# Patient Record
Sex: MALE | Race: OTHER | NOT HISPANIC OR LATINO | URBAN - METROPOLITAN AREA
[De-identification: names, ages, dates, MRNs, and addresses within clinical notes are randomized per-mention and may not be internally consistent; named-entity substitution may affect disease eponyms.]

---

## 2019-08-06 ENCOUNTER — EMERGENCY (EMERGENCY)
Facility: HOSPITAL | Age: 49
LOS: 1 days | Discharge: HOME | End: 2019-08-06
Attending: EMERGENCY MEDICINE | Admitting: EMERGENCY MEDICINE
Payer: SELF-PAY

## 2019-08-06 VITALS
HEART RATE: 67 BPM | DIASTOLIC BLOOD PRESSURE: 107 MMHG | RESPIRATION RATE: 18 BRPM | TEMPERATURE: 98 F | SYSTOLIC BLOOD PRESSURE: 167 MMHG | OXYGEN SATURATION: 100 %

## 2019-08-06 PROCEDURE — 99284 EMERGENCY DEPT VISIT MOD MDM: CPT

## 2019-08-06 NOTE — ED PROVIDER NOTE - CLINICAL SUMMARY MEDICAL DECISION MAKING FREE TEXT BOX
Pt is a 50 y/o M w/ a PMHx of HTN and HLD who p/w left sided chest pain with low to moderate suspicion for ACS (HEART Score of 4). low suspicion for PE or dissection of PTx or PNA given histroy and physical exam. Will get CBC, CMP, trop, EKG, CXR, PT/INR, aPTT, give ASA, and likely CDU for Stress and echo.

## 2019-08-06 NOTE — ED PROVIDER NOTE - ATTENDING CONTRIBUTION TO CARE
49M  L side CP rad down L arm x 1 week, today it became worse.  Still having CP.  No cough, no fever, no vomiting, no LOC.  no leg swelling (+)SOB.  Sometimes some burning in his throat.   Worse after eating.  PMHX - HTN, HLD.  PSHX - none.  No T.  All - NKA.  Pt from [facility], came here 2 weeks ago. No h/o ST / angio. FA started having CAD in his 50's.   cousin had MI and  at 41.  Plan check labs, CE, CXR.  pt ambivalent about staying due to not a resident here and has no insurance.  Offered hospital admission but pt may not stay would be AMA.  No CDU beds available. 49M  L side CP rad down L arm x 1 week, today it became worse.  Still having CP.  No cough, no fever, no vomiting, no LOC.  no leg swelling (+)SOB.  Sometimes some burning in his throat.   Worse after eating.  PMHX - HTN, HLD.  PSHX - none.  No T.  All - NKA.  Pt from [facility], came here 2 weeks ago. No h/o ST / angio. FA started having CAD in his 50's.   cousin had MI and  at 41.  PERC negative.  Plan check labs, CE, CXR.  pt ambivalent about staying due to not a resident here and has no insurance.  Offered hospital admission but pt may not stay would be AMA.  No CDU beds available.  VS:  unremarkable except htn    GEN - NAD;malaise; A+O x3   HEAD - NC/AT     ENT - PEERL, EOMI, mucous membranes dry , no discharge      NECK: Neck supple, non-tender without lymphadenopathy, no masses, no JVD  PULM - CTA b/l,  symmetric breath sounds  COR -  normal heart sounds    ABD - , ND, NT, soft,  BACK - no CVA tenderness, nontender spine     EXTREMS - no edema, no deformity, warm and well perfused    SKIN - no rash or bruising      NEUROLOGIC - alert, CN 2-12 intact, sensation nl, motor no focal deficit.

## 2019-08-06 NOTE — ED ADULT TRIAGE NOTE - CHIEF COMPLAINT QUOTE
chest pain radiating to the left arm, shoulder and back on and off since last week. c/o dizziness and SOB on and off. PMH of HTN but does not take his medication regularly. PMH of high cholesterol

## 2019-08-06 NOTE — ED PROVIDER NOTE - PROGRESS NOTE DETAILS
Repeat EKG sinus jamilah but otherwise unchanged and without ischemic changes. Trop 7-->7. ideally patient could go to CDU for stress test, however no beds available and offered patient admission but desires not to stay in hospital and to follow-up with a cardiologist as an out patient for further testing.   -Pipe Trejo PGY4 [facility]#[id number]

## 2019-08-06 NOTE — ED PROVIDER NOTE - OBJECTIVE STATEMENT
Pt is a 48 y/o M w/ a PMHx of HTn and HLD who p/w left sided chest pain that started about a week ago. Pt states that it is like a squeezing pain that radiates to the left arm, and associated with shortness of breath and dry mouth, pt states it is not associated with exertion and is mostly when he is sitting down. Also associated with palpitations. Denies fevers and coughing, does not change with position. Also associated with pre-syncope. Pt is a 50 y/o M w/ a PMHx of HTN and HLD who p/w left sided chest pain that started about a week ago. Pt states that it is like a squeezing pain that radiates to the left arm, and associated with shortness of breath and dry mouth, pt states it is not associated with exertion and is mostly when he is sitting down. Also associated with palpitations. Denies fevers and coughing, does not change with position. Also associated with pre-syncope.

## 2019-08-06 NOTE — ED PROVIDER NOTE - NSFOLLOWUPINSTRUCTIONS_ED_ALL_ED_FT
You were seen at [facility] for chest pain. You had normal chest xray, normal lab work twice, and 2 normal EKGs. You likely are not having a heart attach and no opther dangerous pathology involving your lungs. This pain could be muscular in nature.     You can take 600mg of ibuprofen with food every 6 hours for 48 hours and then every 6 hours as needed for pain.     You are to follow-up with a cardiologist in the next 1 week to further discuss testing such as a stress test.  You can call one of the numbers on the referral form or you can call the number below.     You are to follow-up in the next 1-2 weeks with [facility] Division of Cardiology at [facility]. Please call [phone] for an appointment.     You should return to the hospital if you begin to have persistent chest pain especially that ratiates to the arm/jaw/back, shortness of breath or chest pain with exertion that is different than normal for you, new or worsening in any lower extremity edema (swelling), sudden onset of cold sweats with difficulty breathing, or for any other concerns.  Form more information on Heart Health please visit the American [employer] Association's Website at: [email or web address]

## 2019-08-07 VITALS
OXYGEN SATURATION: 100 % | HEART RATE: 59 BPM | SYSTOLIC BLOOD PRESSURE: 144 MMHG | DIASTOLIC BLOOD PRESSURE: 77 MMHG | RESPIRATION RATE: 16 BRPM | TEMPERATURE: 96 F

## 2019-08-07 LAB
ALBUMIN SERPL ELPH-MCNC: 4.7 G/DL — SIGNIFICANT CHANGE UP (ref 3.3–5)
ALP SERPL-CCNC: 55 U/L — SIGNIFICANT CHANGE UP (ref 40–120)
ALT FLD-CCNC: 35 U/L — SIGNIFICANT CHANGE UP (ref 4–41)
ANION GAP SERPL CALC-SCNC: 11 MMO/L — SIGNIFICANT CHANGE UP (ref 7–14)
APTT BLD: 29.8 SEC — SIGNIFICANT CHANGE UP (ref 27.5–36.3)
AST SERPL-CCNC: 25 U/L — SIGNIFICANT CHANGE UP (ref 4–40)
BASOPHILS # BLD AUTO: 0.05 K/UL — SIGNIFICANT CHANGE UP (ref 0–0.2)
BASOPHILS NFR BLD AUTO: 0.6 % — SIGNIFICANT CHANGE UP (ref 0–2)
BILIRUB SERPL-MCNC: 0.5 MG/DL — SIGNIFICANT CHANGE UP (ref 0.2–1.2)
BUN SERPL-MCNC: 21 MG/DL — SIGNIFICANT CHANGE UP (ref 7–23)
CALCIUM SERPL-MCNC: 9.7 MG/DL — SIGNIFICANT CHANGE UP (ref 8.4–10.5)
CHLORIDE SERPL-SCNC: 102 MMOL/L — SIGNIFICANT CHANGE UP (ref 98–107)
CO2 SERPL-SCNC: 27 MMOL/L — SIGNIFICANT CHANGE UP (ref 22–31)
CREAT SERPL-MCNC: 1.11 MG/DL — SIGNIFICANT CHANGE UP (ref 0.5–1.3)
EOSINOPHIL # BLD AUTO: 0.32 K/UL — SIGNIFICANT CHANGE UP (ref 0–0.5)
EOSINOPHIL NFR BLD AUTO: 4 % — SIGNIFICANT CHANGE UP (ref 0–6)
GLUCOSE SERPL-MCNC: 104 MG/DL — HIGH (ref 70–99)
HCT VFR BLD CALC: 48.1 % — SIGNIFICANT CHANGE UP (ref 39–50)
HGB BLD-MCNC: 16.5 G/DL — SIGNIFICANT CHANGE UP (ref 13–17)
IMM GRANULOCYTES NFR BLD AUTO: 0.4 % — SIGNIFICANT CHANGE UP (ref 0–1.5)
INR BLD: 0.99 — SIGNIFICANT CHANGE UP (ref 0.88–1.17)
LYMPHOCYTES # BLD AUTO: 2.62 K/UL — SIGNIFICANT CHANGE UP (ref 1–3.3)
LYMPHOCYTES # BLD AUTO: 32.7 % — SIGNIFICANT CHANGE UP (ref 13–44)
MCHC RBC-ENTMCNC: 30.7 PG — SIGNIFICANT CHANGE UP (ref 27–34)
MCHC RBC-ENTMCNC: 34.3 % — SIGNIFICANT CHANGE UP (ref 32–36)
MCV RBC AUTO: 89.4 FL — SIGNIFICANT CHANGE UP (ref 80–100)
MONOCYTES # BLD AUTO: 0.62 K/UL — SIGNIFICANT CHANGE UP (ref 0–0.9)
MONOCYTES NFR BLD AUTO: 7.7 % — SIGNIFICANT CHANGE UP (ref 2–14)
NEUTROPHILS # BLD AUTO: 4.37 K/UL — SIGNIFICANT CHANGE UP (ref 1.8–7.4)
NEUTROPHILS NFR BLD AUTO: 54.6 % — SIGNIFICANT CHANGE UP (ref 43–77)
NRBC # FLD: 0 K/UL — SIGNIFICANT CHANGE UP (ref 0–0)
PLATELET # BLD AUTO: 247 K/UL — SIGNIFICANT CHANGE UP (ref 150–400)
PMV BLD: 10 FL — SIGNIFICANT CHANGE UP (ref 7–13)
POTASSIUM SERPL-MCNC: 4.2 MMOL/L — SIGNIFICANT CHANGE UP (ref 3.5–5.3)
POTASSIUM SERPL-SCNC: 4.2 MMOL/L — SIGNIFICANT CHANGE UP (ref 3.5–5.3)
PROT SERPL-MCNC: 7.6 G/DL — SIGNIFICANT CHANGE UP (ref 6–8.3)
PROTHROM AB SERPL-ACNC: 11 SEC — SIGNIFICANT CHANGE UP (ref 9.8–13.1)
RBC # BLD: 5.38 M/UL — SIGNIFICANT CHANGE UP (ref 4.2–5.8)
RBC # FLD: 11.7 % — SIGNIFICANT CHANGE UP (ref 10.3–14.5)
SODIUM SERPL-SCNC: 140 MMOL/L — SIGNIFICANT CHANGE UP (ref 135–145)
TROPONIN T, HIGH SENSITIVITY: 7 NG/L — SIGNIFICANT CHANGE UP (ref ?–14)
TROPONIN T, HIGH SENSITIVITY: 7 NG/L — SIGNIFICANT CHANGE UP (ref ?–14)
WBC # BLD: 8.01 K/UL — SIGNIFICANT CHANGE UP (ref 3.8–10.5)
WBC # FLD AUTO: 8.01 K/UL — SIGNIFICANT CHANGE UP (ref 3.8–10.5)

## 2019-08-07 PROCEDURE — 71046 X-RAY EXAM CHEST 2 VIEWS: CPT | Mod: 26

## 2019-08-07 RX ORDER — ASPIRIN/CALCIUM CARB/MAGNESIUM 324 MG
162 TABLET ORAL ONCE
Refills: 0 | Status: COMPLETED | OUTPATIENT
Start: 2019-08-07 | End: 2019-08-07

## 2019-08-07 RX ADMIN — Medication 162 MILLIGRAM(S): at 00:27

## 2019-08-07 NOTE — ED ADULT NURSE NOTE - NSIMPLEMENTINTERV_GEN_ALL_ED
Implemented All Universal Safety Interventions:  [place] to call system. Call bell, personal items and telephone within reach. Instruct patient to call for assistance. Room bathroom lighting operational. Non-slip footwear when patient is off stretcher. Physically safe environment: no spills, clutter or unnecessary equipment. Stretcher in lowest position, wheels locked, appropriate side rails in place.

## 2019-08-07 NOTE — ED ADULT NURSE NOTE - OBJECTIVE STATEMENT
Pt received to room 9 a/o x 3 c/o intermittent left sided chest pressure that radiates down his left arm and upper back, SOB, Dizziness x 1 week. Pt has hx of hypertension but doesn't take hit medication regularly. Respirations even and unlabored. Lung sounds clear with equal chest rise bilaterally. ABD is soft, non tender, non distended with normal active bowel sounds No complaints of headache, nausea, dizziness, vomiting  SOB, fever, chills verbalized. pt placed on cardiac monitor in NSR.

## 2021-02-13 NOTE — ED PROVIDER NOTE - CARDIOVASCULAR [+], MLM
[Dear  ___] : Dear  [unfilled], [Consult Letter:] : I had the pleasure of evaluating your patient, [unfilled]. [Please see my note below.] : Please see my note below. [Consult Closing:] : Thank you very much for allowing me to participate in the care of this patient.  If you have any questions, please do not hesitate to contact me. [Sincerely,] : Sincerely, [FreeTextEntry3] : Kobe Bhatti MD\par Division of Pediatric Orthopaedics and Rehabilitation\par [facility]\par [address]\[place] [address]\par [phone]\par fax: [phone]\par  CHEST PAIN
